# Patient Record
Sex: FEMALE | Race: WHITE | Employment: UNEMPLOYED | URBAN - METROPOLITAN AREA
[De-identification: names, ages, dates, MRNs, and addresses within clinical notes are randomized per-mention and may not be internally consistent; named-entity substitution may affect disease eponyms.]

---

## 2017-10-05 ENCOUNTER — HOSPITAL ENCOUNTER (OUTPATIENT)
Dept: MAMMOGRAPHY | Age: 62
Discharge: HOME | End: 2017-10-05
Attending: INTERNAL MEDICINE
Payer: COMMERCIAL

## 2017-10-05 DIAGNOSIS — Z12.31 VISIT FOR SCREENING MAMMOGRAM: ICD-10-CM

## 2017-10-05 PROCEDURE — 77067 SCR MAMMO BI INCL CAD: CPT

## 2018-10-13 ENCOUNTER — HOSPITAL ENCOUNTER (OUTPATIENT)
Dept: MAMMOGRAPHY | Age: 63
Discharge: HOME | End: 2018-10-13
Attending: INTERNAL MEDICINE
Payer: COMMERCIAL

## 2018-10-13 DIAGNOSIS — Z12.31 VISIT FOR SCREENING MAMMOGRAM: ICD-10-CM

## 2018-10-13 PROCEDURE — 77063 BREAST TOMOSYNTHESIS BI: CPT

## 2019-01-17 ENCOUNTER — HOSPITAL ENCOUNTER (OUTPATIENT)
Dept: MAMMOGRAPHY | Age: 64
Discharge: HOME | End: 2019-01-17
Attending: INTERNAL MEDICINE
Payer: COMMERCIAL

## 2019-01-17 DIAGNOSIS — N64.3 GALACTORRHEA: ICD-10-CM

## 2019-01-17 PROCEDURE — 76642 ULTRASOUND BREAST LIMITED: CPT

## 2019-01-17 PROCEDURE — 77066 DX MAMMO INCL CAD BI: CPT

## 2019-07-11 ENCOUNTER — HOSPITAL ENCOUNTER (OUTPATIENT)
Dept: PHYSICAL THERAPY | Age: 64
Discharge: HOME | End: 2019-07-11
Payer: COMMERCIAL

## 2019-07-11 PROCEDURE — 97161 PT EVAL LOW COMPLEX 20 MIN: CPT

## 2019-07-11 PROCEDURE — 97140 MANUAL THERAPY 1/> REGIONS: CPT

## 2019-07-11 PROCEDURE — 97110 THERAPEUTIC EXERCISES: CPT
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
## 2019-07-19 ENCOUNTER — HOSPITAL ENCOUNTER (OUTPATIENT)
Dept: PHYSICAL THERAPY | Age: 64
Discharge: HOME | End: 2019-07-19
Payer: COMMERCIAL

## 2019-07-19 PROCEDURE — 97140 MANUAL THERAPY 1/> REGIONS: CPT

## 2019-07-19 PROCEDURE — 97110 THERAPEUTIC EXERCISES: CPT
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
## 2019-07-22 ENCOUNTER — HOSPITAL ENCOUNTER (OUTPATIENT)
Dept: PHYSICAL THERAPY | Age: 64
Discharge: HOME | End: 2019-07-22
Payer: COMMERCIAL

## 2019-07-22 PROCEDURE — 97110 THERAPEUTIC EXERCISES: CPT

## 2019-07-22 PROCEDURE — 97140 MANUAL THERAPY 1/> REGIONS: CPT
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
## 2019-07-24 ENCOUNTER — HOSPITAL ENCOUNTER (OUTPATIENT)
Dept: PHYSICAL THERAPY | Age: 64
Discharge: HOME | End: 2019-07-24
Payer: COMMERCIAL

## 2019-07-24 PROCEDURE — 97140 MANUAL THERAPY 1/> REGIONS: CPT

## 2019-07-24 PROCEDURE — 97110 THERAPEUTIC EXERCISES: CPT
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
## 2019-08-02 ENCOUNTER — HOSPITAL ENCOUNTER (OUTPATIENT)
Dept: PHYSICAL THERAPY | Age: 64
Discharge: HOME | End: 2019-08-02
Payer: COMMERCIAL

## 2019-08-02 PROCEDURE — 97110 THERAPEUTIC EXERCISES: CPT

## 2019-08-02 PROCEDURE — 97140 MANUAL THERAPY 1/> REGIONS: CPT

## 2019-08-02 PROCEDURE — 97035 APP MDLTY 1+ULTRASOUND EA 15: CPT

## 2019-08-02 PROCEDURE — 97014 ELECTRIC STIMULATION THERAPY: CPT
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
## 2019-08-05 ENCOUNTER — HOSPITAL ENCOUNTER (OUTPATIENT)
Dept: PHYSICAL THERAPY | Age: 64
Discharge: HOME | End: 2019-08-05
Payer: COMMERCIAL

## 2019-08-05 PROCEDURE — 97140 MANUAL THERAPY 1/> REGIONS: CPT

## 2019-08-05 PROCEDURE — 97110 THERAPEUTIC EXERCISES: CPT

## 2019-08-05 PROCEDURE — 97014 ELECTRIC STIMULATION THERAPY: CPT

## 2019-08-05 PROCEDURE — 97035 APP MDLTY 1+ULTRASOUND EA 15: CPT
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
## 2019-08-09 ENCOUNTER — HOSPITAL ENCOUNTER (OUTPATIENT)
Dept: PHYSICAL THERAPY | Age: 64
Discharge: HOME | End: 2019-08-09
Payer: COMMERCIAL

## 2019-08-09 PROCEDURE — 97140 MANUAL THERAPY 1/> REGIONS: CPT

## 2019-08-09 PROCEDURE — 97014 ELECTRIC STIMULATION THERAPY: CPT

## 2019-08-09 PROCEDURE — 97035 APP MDLTY 1+ULTRASOUND EA 15: CPT
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
## 2019-08-12 ENCOUNTER — HOSPITAL ENCOUNTER (OUTPATIENT)
Dept: PHYSICAL THERAPY | Age: 64
Discharge: HOME | End: 2019-08-12
Payer: COMMERCIAL

## 2019-08-12 PROCEDURE — 97035 APP MDLTY 1+ULTRASOUND EA 15: CPT

## 2019-08-12 PROCEDURE — 97110 THERAPEUTIC EXERCISES: CPT

## 2019-08-12 PROCEDURE — 97140 MANUAL THERAPY 1/> REGIONS: CPT

## 2019-08-12 PROCEDURE — 97014 ELECTRIC STIMULATION THERAPY: CPT
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
## 2019-08-16 ENCOUNTER — HOSPITAL ENCOUNTER (OUTPATIENT)
Dept: PHYSICAL THERAPY | Age: 64
Discharge: HOME | End: 2019-08-16
Payer: COMMERCIAL

## 2019-08-16 PROCEDURE — 97140 MANUAL THERAPY 1/> REGIONS: CPT

## 2019-08-16 PROCEDURE — 97035 APP MDLTY 1+ULTRASOUND EA 15: CPT

## 2019-08-16 PROCEDURE — 97110 THERAPEUTIC EXERCISES: CPT

## 2019-08-16 PROCEDURE — 97014 ELECTRIC STIMULATION THERAPY: CPT
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
## 2019-08-19 ENCOUNTER — HOSPITAL ENCOUNTER (OUTPATIENT)
Dept: PHYSICAL THERAPY | Age: 64
Discharge: HOME | End: 2019-08-19
Payer: COMMERCIAL

## 2019-08-19 PROCEDURE — 97035 APP MDLTY 1+ULTRASOUND EA 15: CPT

## 2019-08-19 PROCEDURE — 97110 THERAPEUTIC EXERCISES: CPT

## 2019-08-19 PROCEDURE — 97014 ELECTRIC STIMULATION THERAPY: CPT

## 2019-08-19 PROCEDURE — 97140 MANUAL THERAPY 1/> REGIONS: CPT
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
## 2019-08-23 ENCOUNTER — HOSPITAL ENCOUNTER (OUTPATIENT)
Dept: PHYSICAL THERAPY | Age: 64
End: 2019-08-23
Payer: COMMERCIAL

## 2020-10-30 ENCOUNTER — TRANSCRIBE ORDER (OUTPATIENT)
Dept: SCHEDULING | Age: 65
End: 2020-10-30

## 2020-10-30 DIAGNOSIS — Z12.31 SCREENING MAMMOGRAM, ENCOUNTER FOR: Primary | ICD-10-CM

## 2020-12-03 ENCOUNTER — HOSPITAL ENCOUNTER (OUTPATIENT)
Dept: MAMMOGRAPHY | Age: 65
Discharge: HOME | End: 2020-12-03
Attending: INTERNAL MEDICINE
Payer: MEDICARE

## 2020-12-03 DIAGNOSIS — Z12.31 SCREENING MAMMOGRAM, ENCOUNTER FOR: ICD-10-CM

## 2020-12-03 PROCEDURE — 77063 BREAST TOMOSYNTHESIS BI: CPT

## 2021-12-17 ENCOUNTER — TRANSCRIBE ORDER (OUTPATIENT)
Dept: REGISTRATION | Age: 66
End: 2021-12-17

## 2021-12-17 DIAGNOSIS — Z12.31 SCREENING MAMMOGRAM FOR HIGH-RISK PATIENT: Primary | ICD-10-CM

## 2022-01-06 ENCOUNTER — HOSPITAL ENCOUNTER (OUTPATIENT)
Dept: MAMMOGRAPHY | Age: 67
Discharge: HOME | End: 2022-01-06
Attending: INTERNAL MEDICINE
Payer: MEDICARE

## 2022-01-06 DIAGNOSIS — Z12.31 SCREENING MAMMOGRAM FOR HIGH-RISK PATIENT: ICD-10-CM

## 2022-01-06 PROCEDURE — 77063 BREAST TOMOSYNTHESIS BI: CPT

## 2022-03-08 ENCOUNTER — HOSPITAL ENCOUNTER (OUTPATIENT)
Dept: PHYSICAL THERAPY | Age: 67
Discharge: HOME | End: 2022-03-08
Payer: MEDICARE

## 2022-03-08 PROCEDURE — 97140 MANUAL THERAPY 1/> REGIONS: CPT

## 2022-03-08 PROCEDURE — 97110 THERAPEUTIC EXERCISES: CPT

## 2022-03-08 PROCEDURE — 97161 PT EVAL LOW COMPLEX 20 MIN: CPT
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
## 2022-03-18 ENCOUNTER — HOSPITAL ENCOUNTER (OUTPATIENT)
Dept: PHYSICAL THERAPY | Age: 67
Discharge: HOME | End: 2022-03-18
Payer: MEDICARE

## 2022-03-18 PROCEDURE — 97110 THERAPEUTIC EXERCISES: CPT

## 2022-03-18 PROCEDURE — 97140 MANUAL THERAPY 1/> REGIONS: CPT
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
## 2022-03-22 ENCOUNTER — HOSPITAL ENCOUNTER (OUTPATIENT)
Dept: PHYSICAL THERAPY | Age: 67
Discharge: HOME | End: 2022-03-22
Payer: MEDICARE

## 2022-03-22 PROCEDURE — 97140 MANUAL THERAPY 1/> REGIONS: CPT

## 2022-03-22 PROCEDURE — 97110 THERAPEUTIC EXERCISES: CPT
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
## 2022-03-25 ENCOUNTER — HOSPITAL ENCOUNTER (OUTPATIENT)
Dept: PHYSICAL THERAPY | Age: 67
Discharge: HOME | End: 2022-03-25
Payer: MEDICARE

## 2022-03-25 NOTE — PROGRESS NOTES
Physical Therapy  [address] [place] [address]  Date: 3/25/22    Patient declined appointment today due to illness.       Minesh Chang DPT

## 2022-03-29 ENCOUNTER — HOSPITAL ENCOUNTER (OUTPATIENT)
Dept: PHYSICAL THERAPY | Age: 67
Discharge: HOME | End: 2022-03-29
Payer: MEDICARE

## 2022-03-29 PROCEDURE — 97140 MANUAL THERAPY 1/> REGIONS: CPT

## 2022-03-29 PROCEDURE — 97110 THERAPEUTIC EXERCISES: CPT
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
## 2022-03-31 ENCOUNTER — HOSPITAL ENCOUNTER (OUTPATIENT)
Dept: PHYSICAL THERAPY | Age: 67
Discharge: HOME | End: 2022-03-31
Payer: MEDICARE

## 2022-03-31 PROCEDURE — 97140 MANUAL THERAPY 1/> REGIONS: CPT

## 2022-03-31 PROCEDURE — 97110 THERAPEUTIC EXERCISES: CPT
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
## 2022-04-05 ENCOUNTER — HOSPITAL ENCOUNTER (OUTPATIENT)
Dept: PHYSICAL THERAPY | Age: 67
Discharge: HOME | End: 2022-04-05
Payer: MEDICARE

## 2022-04-05 PROCEDURE — 97140 MANUAL THERAPY 1/> REGIONS: CPT

## 2022-04-05 PROCEDURE — 97110 THERAPEUTIC EXERCISES: CPT
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
## 2022-04-07 ENCOUNTER — HOSPITAL ENCOUNTER (OUTPATIENT)
Dept: PHYSICAL THERAPY | Age: 67
Discharge: HOME | End: 2022-04-07
Payer: MEDICARE

## 2022-04-07 PROCEDURE — 97140 MANUAL THERAPY 1/> REGIONS: CPT
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
## 2022-04-12 ENCOUNTER — APPOINTMENT (OUTPATIENT)
Dept: PHYSICAL THERAPY | Age: 67
End: 2022-04-12
Payer: MEDICARE

## 2022-04-13 ENCOUNTER — HOSPITAL ENCOUNTER (OUTPATIENT)
Dept: PHYSICAL THERAPY | Age: 67
Discharge: HOME | End: 2022-04-13
Payer: MEDICARE

## 2022-04-13 PROCEDURE — 97140 MANUAL THERAPY 1/> REGIONS: CPT

## 2022-04-13 PROCEDURE — 97110 THERAPEUTIC EXERCISES: CPT
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
## 2022-04-14 ENCOUNTER — HOSPITAL ENCOUNTER (OUTPATIENT)
Dept: PHYSICAL THERAPY | Age: 67
Discharge: HOME | End: 2022-04-14
Payer: MEDICARE

## 2022-04-14 PROCEDURE — 97140 MANUAL THERAPY 1/> REGIONS: CPT

## 2022-04-14 PROCEDURE — 97110 THERAPEUTIC EXERCISES: CPT

## 2022-04-19 ENCOUNTER — HOSPITAL ENCOUNTER (OUTPATIENT)
Dept: PHYSICAL THERAPY | Age: 67
Discharge: HOME | End: 2022-04-19
Payer: MEDICARE

## 2022-04-19 PROCEDURE — 97110 THERAPEUTIC EXERCISES: CPT

## 2022-04-19 PROCEDURE — 97140 MANUAL THERAPY 1/> REGIONS: CPT
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
## 2022-04-21 ENCOUNTER — APPOINTMENT (OUTPATIENT)
Dept: PHYSICAL THERAPY | Age: 67
End: 2022-04-21
Payer: MEDICARE

## 2022-04-26 ENCOUNTER — HOSPITAL ENCOUNTER (OUTPATIENT)
Dept: PHYSICAL THERAPY | Age: 67
Discharge: HOME | End: 2022-04-26
Payer: MEDICARE

## 2022-04-28 ENCOUNTER — HOSPITAL ENCOUNTER (OUTPATIENT)
Dept: PHYSICAL THERAPY | Age: 67
Discharge: HOME | End: 2022-04-28
Payer: MEDICARE

## 2022-04-28 PROCEDURE — 97140 MANUAL THERAPY 1/> REGIONS: CPT

## 2022-04-28 PROCEDURE — 97110 THERAPEUTIC EXERCISES: CPT
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
## 2022-05-02 ENCOUNTER — HOSPITAL ENCOUNTER (OUTPATIENT)
Dept: PHYSICAL THERAPY | Age: 67
Setting detail: RECURRING SERIES
Discharge: HOME | End: 2022-05-05
Payer: MEDICARE

## 2022-05-03 ENCOUNTER — HOSPITAL ENCOUNTER (OUTPATIENT)
Dept: PHYSICAL THERAPY | Age: 67
Discharge: HOME | End: 2022-05-03
Payer: MEDICARE

## 2022-05-03 PROCEDURE — 97110 THERAPEUTIC EXERCISES: CPT

## 2022-05-03 PROCEDURE — 97140 MANUAL THERAPY 1/> REGIONS: CPT
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
## 2022-05-05 ENCOUNTER — HOSPITAL ENCOUNTER (OUTPATIENT)
Dept: PHYSICAL THERAPY | Age: 67
Discharge: HOME | End: 2022-05-05
Payer: MEDICARE

## 2022-05-05 PROCEDURE — 97110 THERAPEUTIC EXERCISES: CPT

## 2022-05-05 PROCEDURE — 97140 MANUAL THERAPY 1/> REGIONS: CPT
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
## 2022-05-10 ENCOUNTER — HOSPITAL ENCOUNTER (OUTPATIENT)
Dept: PHYSICAL THERAPY | Age: 67
Discharge: HOME | End: 2022-05-10
Payer: MEDICARE

## 2022-05-10 PROCEDURE — 97110 THERAPEUTIC EXERCISES: CPT

## 2022-05-10 PROCEDURE — 97140 MANUAL THERAPY 1/> REGIONS: CPT
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
## 2022-05-12 ENCOUNTER — HOSPITAL ENCOUNTER (OUTPATIENT)
Dept: PHYSICAL THERAPY | Age: 67
Discharge: HOME | End: 2022-05-12
Payer: MEDICARE

## 2022-05-12 PROCEDURE — 97140 MANUAL THERAPY 1/> REGIONS: CPT
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
## 2022-05-16 ENCOUNTER — HOSPITAL ENCOUNTER (OUTPATIENT)
Dept: PHYSICAL THERAPY | Age: 67
Discharge: HOME | End: 2022-05-16
Payer: MEDICARE

## 2022-05-16 PROCEDURE — 97110 THERAPEUTIC EXERCISES: CPT

## 2022-05-16 PROCEDURE — 97140 MANUAL THERAPY 1/> REGIONS: CPT
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
## 2022-05-17 ENCOUNTER — APPOINTMENT (OUTPATIENT)
Dept: PHYSICAL THERAPY | Age: 67
End: 2022-05-17
Payer: MEDICARE

## 2022-05-19 ENCOUNTER — HOSPITAL ENCOUNTER (OUTPATIENT)
Dept: PHYSICAL THERAPY | Age: 67
Discharge: HOME | End: 2022-05-19
Payer: MEDICARE

## 2022-05-23 ENCOUNTER — HOSPITAL ENCOUNTER (OUTPATIENT)
Dept: PHYSICAL THERAPY | Age: 67
Setting detail: RECURRING SERIES
Discharge: HOME | End: 2022-05-26
Payer: MEDICARE

## 2022-05-23 PROCEDURE — 97110 THERAPEUTIC EXERCISES: CPT

## 2022-05-23 PROCEDURE — 97140 MANUAL THERAPY 1/> REGIONS: CPT

## 2022-05-23 ASSESSMENT — PAIN DESCRIPTION - LOCATION: LOCATION: BACK

## 2022-05-23 ASSESSMENT — PAIN DESCRIPTION - ORIENTATION: ORIENTATION: UPPER;LEFT

## 2022-05-23 ASSESSMENT — PAIN SCALES - GENERAL: PAINLEVEL_OUTOF10: 3

## 2022-05-23 ASSESSMENT — PAIN DESCRIPTION - DESCRIPTORS: DESCRIPTORS: ACHING;TIGHTNESS
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
## 2022-05-25 ENCOUNTER — HOSPITAL ENCOUNTER (OUTPATIENT)
Dept: PHYSICAL THERAPY | Age: 67
Setting detail: RECURRING SERIES
Discharge: HOME | End: 2022-05-28
Payer: MEDICARE

## 2022-05-25 PROCEDURE — 97140 MANUAL THERAPY 1/> REGIONS: CPT

## 2022-05-25 PROCEDURE — 97110 THERAPEUTIC EXERCISES: CPT

## 2022-05-25 ASSESSMENT — PAIN SCALES - GENERAL: PAINLEVEL_OUTOF10: 4

## 2022-05-25 ASSESSMENT — PAIN DESCRIPTION - DESCRIPTORS: DESCRIPTORS: ACHING;SORE

## 2022-05-25 ASSESSMENT — PAIN DESCRIPTION - LOCATION: LOCATION: BACK

## 2022-05-25 ASSESSMENT — PAIN DESCRIPTION - ORIENTATION: ORIENTATION: UPPER
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
## 2022-06-01 ENCOUNTER — HOSPITAL ENCOUNTER (OUTPATIENT)
Dept: PHYSICAL THERAPY | Age: 67
Setting detail: RECURRING SERIES
Discharge: HOME | End: 2022-06-04
Payer: MEDICARE

## 2022-06-01 PROCEDURE — 97140 MANUAL THERAPY 1/> REGIONS: CPT

## 2022-06-01 PROCEDURE — 97110 THERAPEUTIC EXERCISES: CPT

## 2022-06-01 ASSESSMENT — PAIN DESCRIPTION - LOCATION: LOCATION: NECK

## 2022-06-01 ASSESSMENT — PAIN SCALES - GENERAL: PAINLEVEL_OUTOF10: 2

## 2022-06-01 ASSESSMENT — PAIN DESCRIPTION - ORIENTATION: ORIENTATION: LOWER

## 2022-06-01 ASSESSMENT — PAIN DESCRIPTION - DESCRIPTORS: DESCRIPTORS: SORE;TIGHTNESS
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
## 2022-06-03 ENCOUNTER — HOSPITAL ENCOUNTER (OUTPATIENT)
Dept: PHYSICAL THERAPY | Age: 67
Setting detail: RECURRING SERIES
Discharge: HOME | End: 2022-06-06
Payer: MEDICARE

## 2022-06-03 PROCEDURE — 97140 MANUAL THERAPY 1/> REGIONS: CPT

## 2022-06-03 PROCEDURE — 97110 THERAPEUTIC EXERCISES: CPT

## 2022-06-03 ASSESSMENT — PAIN DESCRIPTION - DESCRIPTORS: DESCRIPTORS: ACHING;TIGHTNESS

## 2022-06-03 ASSESSMENT — PAIN DESCRIPTION - LOCATION: LOCATION: NECK;SHOULDER

## 2022-06-03 ASSESSMENT — PAIN DESCRIPTION - ORIENTATION: ORIENTATION: LEFT

## 2022-06-03 ASSESSMENT — PAIN SCALES - GENERAL: PAINLEVEL_OUTOF10: 3
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
## 2022-06-06 ENCOUNTER — HOSPITAL ENCOUNTER (OUTPATIENT)
Dept: PHYSICAL THERAPY | Age: 67
Setting detail: RECURRING SERIES
Discharge: HOME | End: 2022-06-09
Payer: MEDICARE

## 2022-06-06 PROCEDURE — 97140 MANUAL THERAPY 1/> REGIONS: CPT

## 2022-06-06 PROCEDURE — 97110 THERAPEUTIC EXERCISES: CPT

## 2022-06-06 ASSESSMENT — PAIN DESCRIPTION - LOCATION: LOCATION: NECK

## 2022-06-06 ASSESSMENT — PAIN SCALES - GENERAL: PAINLEVEL_OUTOF10: 3

## 2022-06-06 ASSESSMENT — PAIN DESCRIPTION - DESCRIPTORS: DESCRIPTORS: ACHING;TIGHTNESS

## 2022-06-06 ASSESSMENT — PAIN DESCRIPTION - ORIENTATION: ORIENTATION: LEFT
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
## 2022-06-09 ENCOUNTER — HOSPITAL ENCOUNTER (OUTPATIENT)
Dept: PHYSICAL THERAPY | Age: 67
Setting detail: RECURRING SERIES
Discharge: HOME | End: 2022-06-12
Payer: MEDICARE

## 2022-06-09 PROCEDURE — 97140 MANUAL THERAPY 1/> REGIONS: CPT

## 2022-06-09 PROCEDURE — 97110 THERAPEUTIC EXERCISES: CPT

## 2022-06-09 ASSESSMENT — PAIN SCALES - GENERAL: PAINLEVEL_OUTOF10: 3

## 2022-06-09 ASSESSMENT — PAIN DESCRIPTION - DESCRIPTORS: DESCRIPTORS: ACHING;SORE

## 2022-06-09 ASSESSMENT — PAIN DESCRIPTION - ORIENTATION: ORIENTATION: LEFT

## 2022-06-09 ASSESSMENT — PAIN DESCRIPTION - LOCATION: LOCATION: NECK
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
## 2022-06-13 ENCOUNTER — HOSPITAL ENCOUNTER (OUTPATIENT)
Dept: PHYSICAL THERAPY | Age: 67
Setting detail: RECURRING SERIES
Discharge: HOME | End: 2022-06-16
Payer: MEDICARE

## 2022-06-13 PROCEDURE — 97110 THERAPEUTIC EXERCISES: CPT

## 2022-06-13 PROCEDURE — 97140 MANUAL THERAPY 1/> REGIONS: CPT

## 2022-06-13 ASSESSMENT — PAIN SCALES - GENERAL: PAINLEVEL_OUTOF10: 3

## 2022-06-13 ASSESSMENT — PAIN DESCRIPTION - DESCRIPTORS: DESCRIPTORS: ACHING;TIGHTNESS

## 2022-06-13 ASSESSMENT — PAIN DESCRIPTION - ORIENTATION: ORIENTATION: LEFT

## 2022-06-13 ASSESSMENT — PAIN DESCRIPTION - LOCATION: LOCATION: NECK

## 2022-06-15 ENCOUNTER — HOSPITAL ENCOUNTER (OUTPATIENT)
Dept: PHYSICAL THERAPY | Age: 67
Setting detail: RECURRING SERIES
Discharge: HOME | End: 2022-06-18
Payer: MEDICARE

## 2022-06-15 PROCEDURE — 97110 THERAPEUTIC EXERCISES: CPT

## 2022-06-15 PROCEDURE — 97140 MANUAL THERAPY 1/> REGIONS: CPT

## 2022-06-15 ASSESSMENT — PAIN SCALES - GENERAL: PAINLEVEL_OUTOF10: 2

## 2022-06-15 ASSESSMENT — PAIN DESCRIPTION - DESCRIPTORS: DESCRIPTORS: ACHING;TIGHTNESS

## 2022-06-15 ASSESSMENT — PAIN DESCRIPTION - LOCATION: LOCATION: NECK;SHOULDER

## 2022-06-15 ASSESSMENT — PAIN DESCRIPTION - ORIENTATION: ORIENTATION: LEFT
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
## 2022-06-20 ENCOUNTER — HOSPITAL ENCOUNTER (OUTPATIENT)
Dept: PHYSICAL THERAPY | Age: 67
Setting detail: RECURRING SERIES
Discharge: HOME | End: 2022-06-23
Payer: MEDICARE

## 2022-06-20 PROCEDURE — 97140 MANUAL THERAPY 1/> REGIONS: CPT

## 2022-06-20 PROCEDURE — 97110 THERAPEUTIC EXERCISES: CPT

## 2022-06-20 ASSESSMENT — PAIN DESCRIPTION - LOCATION: LOCATION: SHOULDER

## 2022-06-20 ASSESSMENT — PAIN DESCRIPTION - DESCRIPTORS: DESCRIPTORS: TIGHTNESS;SORE

## 2022-06-20 ASSESSMENT — PAIN DESCRIPTION - ORIENTATION: ORIENTATION: LEFT

## 2022-06-20 ASSESSMENT — PAIN SCALES - GENERAL: PAINLEVEL_OUTOF10: 2
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
## 2022-11-02 ENCOUNTER — HOSPITAL ENCOUNTER (OUTPATIENT)
Dept: PHYSICAL THERAPY | Age: 67
Setting detail: RECURRING SERIES
Discharge: HOME | End: 2022-11-05
Payer: MEDICARE

## 2022-11-02 PROCEDURE — 97110 THERAPEUTIC EXERCISES: CPT

## 2022-11-02 PROCEDURE — 97161 PT EVAL LOW COMPLEX 20 MIN: CPT

## 2022-11-02 PROCEDURE — 97140 MANUAL THERAPY 1/> REGIONS: CPT

## 2022-11-02 ASSESSMENT — PAIN SCALES - GENERAL: PAINLEVEL_OUTOF10: 5
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
## 2022-11-04 ENCOUNTER — HOSPITAL ENCOUNTER (OUTPATIENT)
Dept: PHYSICAL THERAPY | Age: 67
Setting detail: RECURRING SERIES
Discharge: HOME | End: 2022-11-07
Payer: MEDICARE

## 2022-11-04 PROCEDURE — 97140 MANUAL THERAPY 1/> REGIONS: CPT

## 2022-11-04 PROCEDURE — 97110 THERAPEUTIC EXERCISES: CPT

## 2022-11-04 ASSESSMENT — PAIN SCALES - GENERAL: PAINLEVEL_OUTOF10: 5
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
## 2022-11-08 ENCOUNTER — HOSPITAL ENCOUNTER (OUTPATIENT)
Dept: PHYSICAL THERAPY | Age: 67
Setting detail: RECURRING SERIES
Discharge: HOME | End: 2022-11-11
Payer: MEDICARE

## 2022-11-08 PROCEDURE — 97110 THERAPEUTIC EXERCISES: CPT

## 2022-11-08 PROCEDURE — 97140 MANUAL THERAPY 1/> REGIONS: CPT

## 2022-11-08 ASSESSMENT — PAIN SCALES - GENERAL: PAINLEVEL_OUTOF10: 4
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
## 2022-11-10 ENCOUNTER — HOSPITAL ENCOUNTER (OUTPATIENT)
Dept: PHYSICAL THERAPY | Age: 67
Setting detail: RECURRING SERIES
Discharge: HOME | End: 2022-11-13
Payer: MEDICARE

## 2022-11-10 PROCEDURE — 97110 THERAPEUTIC EXERCISES: CPT

## 2022-11-10 PROCEDURE — 97140 MANUAL THERAPY 1/> REGIONS: CPT

## 2022-11-10 ASSESSMENT — PAIN SCALES - GENERAL: PAINLEVEL_OUTOF10: 5
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
## 2022-11-14 ENCOUNTER — HOSPITAL ENCOUNTER (OUTPATIENT)
Dept: PHYSICAL THERAPY | Age: 67
Setting detail: RECURRING SERIES
Discharge: HOME | End: 2022-11-17
Payer: MEDICARE

## 2022-11-14 PROCEDURE — 97140 MANUAL THERAPY 1/> REGIONS: CPT

## 2022-11-14 PROCEDURE — 97110 THERAPEUTIC EXERCISES: CPT

## 2022-11-14 ASSESSMENT — PAIN SCALES - GENERAL: PAINLEVEL_OUTOF10: 6
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
## 2022-11-17 ENCOUNTER — HOSPITAL ENCOUNTER (OUTPATIENT)
Dept: PHYSICAL THERAPY | Age: 67
Setting detail: RECURRING SERIES
Discharge: HOME | End: 2022-11-20
Payer: MEDICARE

## 2022-11-17 PROCEDURE — 97110 THERAPEUTIC EXERCISES: CPT

## 2022-11-17 PROCEDURE — 97140 MANUAL THERAPY 1/> REGIONS: CPT

## 2022-11-17 ASSESSMENT — PAIN SCALES - GENERAL: PAINLEVEL_OUTOF10: 4
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
## 2022-11-22 ENCOUNTER — HOSPITAL ENCOUNTER (OUTPATIENT)
Dept: PHYSICAL THERAPY | Age: 67
Setting detail: RECURRING SERIES
Discharge: HOME | End: 2022-11-25
Payer: MEDICARE

## 2022-11-22 PROCEDURE — 97110 THERAPEUTIC EXERCISES: CPT

## 2022-11-22 ASSESSMENT — PAIN SCALES - GENERAL: PAINLEVEL_OUTOF10: 5
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
## 2022-11-23 ENCOUNTER — HOSPITAL ENCOUNTER (OUTPATIENT)
Dept: PHYSICAL THERAPY | Age: 67
Setting detail: RECURRING SERIES
End: 2022-11-23
Payer: MEDICARE

## 2022-11-29 ENCOUNTER — HOSPITAL ENCOUNTER (OUTPATIENT)
Dept: PHYSICAL THERAPY | Age: 67
Setting detail: RECURRING SERIES
Discharge: HOME | End: 2022-12-02
Payer: MEDICARE

## 2022-11-29 PROCEDURE — 97140 MANUAL THERAPY 1/> REGIONS: CPT

## 2022-11-29 PROCEDURE — 97110 THERAPEUTIC EXERCISES: CPT

## 2022-11-29 ASSESSMENT — PAIN SCALES - GENERAL: PAINLEVEL_OUTOF10: 4
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
## 2022-12-01 ENCOUNTER — HOSPITAL ENCOUNTER (OUTPATIENT)
Dept: PHYSICAL THERAPY | Age: 67
Setting detail: RECURRING SERIES
Discharge: HOME | End: 2022-12-04
Payer: MEDICARE

## 2022-12-01 PROCEDURE — 97140 MANUAL THERAPY 1/> REGIONS: CPT

## 2022-12-01 PROCEDURE — 97110 THERAPEUTIC EXERCISES: CPT

## 2022-12-01 ASSESSMENT — PAIN SCALES - GENERAL: PAINLEVEL_OUTOF10: 3
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
## 2022-12-06 ENCOUNTER — HOSPITAL ENCOUNTER (OUTPATIENT)
Dept: PHYSICAL THERAPY | Age: 67
Setting detail: RECURRING SERIES
Discharge: HOME | End: 2022-12-09
Payer: MEDICARE

## 2022-12-06 PROCEDURE — 97140 MANUAL THERAPY 1/> REGIONS: CPT

## 2022-12-06 PROCEDURE — 97110 THERAPEUTIC EXERCISES: CPT

## 2022-12-06 ASSESSMENT — PAIN SCALES - GENERAL: PAINLEVEL_OUTOF10: 4
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
## 2022-12-08 ENCOUNTER — HOSPITAL ENCOUNTER (OUTPATIENT)
Dept: PHYSICAL THERAPY | Age: 67
Setting detail: RECURRING SERIES
Discharge: HOME | End: 2022-12-11
Payer: MEDICARE

## 2022-12-08 PROCEDURE — 97140 MANUAL THERAPY 1/> REGIONS: CPT

## 2022-12-08 PROCEDURE — 97110 THERAPEUTIC EXERCISES: CPT

## 2022-12-08 ASSESSMENT — PAIN SCALES - GENERAL: PAINLEVEL_OUTOF10: 5
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
## 2022-12-13 ENCOUNTER — HOSPITAL ENCOUNTER (OUTPATIENT)
Dept: PHYSICAL THERAPY | Age: 67
Setting detail: RECURRING SERIES
Discharge: HOME | End: 2022-12-16
Payer: MEDICARE

## 2022-12-13 PROCEDURE — 97140 MANUAL THERAPY 1/> REGIONS: CPT

## 2022-12-13 PROCEDURE — 97110 THERAPEUTIC EXERCISES: CPT

## 2022-12-13 ASSESSMENT — PAIN SCALES - GENERAL: PAINLEVEL_OUTOF10: 3
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
## 2022-12-15 ENCOUNTER — HOSPITAL ENCOUNTER (OUTPATIENT)
Dept: PHYSICAL THERAPY | Age: 67
Setting detail: RECURRING SERIES
Discharge: HOME | End: 2022-12-18
Payer: MEDICARE

## 2022-12-15 PROCEDURE — 97140 MANUAL THERAPY 1/> REGIONS: CPT

## 2022-12-15 PROCEDURE — 97110 THERAPEUTIC EXERCISES: CPT

## 2022-12-15 ASSESSMENT — PAIN SCALES - GENERAL: PAINLEVEL_OUTOF10: 2
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
## 2022-12-20 ENCOUNTER — HOSPITAL ENCOUNTER (OUTPATIENT)
Dept: PHYSICAL THERAPY | Age: 67
Setting detail: RECURRING SERIES
Discharge: HOME | End: 2022-12-23
Payer: MEDICARE

## 2022-12-20 PROCEDURE — 97140 MANUAL THERAPY 1/> REGIONS: CPT

## 2022-12-20 PROCEDURE — 97110 THERAPEUTIC EXERCISES: CPT

## 2022-12-20 ASSESSMENT — PAIN SCALES - GENERAL: PAINLEVEL_OUTOF10: 0
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
## 2022-12-22 ENCOUNTER — HOSPITAL ENCOUNTER (OUTPATIENT)
Dept: PHYSICAL THERAPY | Age: 67
Setting detail: RECURRING SERIES
Discharge: HOME | End: 2022-12-25
Payer: MEDICARE

## 2022-12-22 PROCEDURE — 97140 MANUAL THERAPY 1/> REGIONS: CPT

## 2022-12-22 PROCEDURE — 97110 THERAPEUTIC EXERCISES: CPT

## 2022-12-22 ASSESSMENT — PAIN SCALES - GENERAL: PAINLEVEL_OUTOF10: 3
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
## 2022-12-29 ENCOUNTER — HOSPITAL ENCOUNTER (OUTPATIENT)
Dept: PHYSICAL THERAPY | Age: 67
Setting detail: RECURRING SERIES
End: 2022-12-29
Payer: MEDICARE

## 2022-12-29 PROCEDURE — 97140 MANUAL THERAPY 1/> REGIONS: CPT

## 2022-12-29 PROCEDURE — 97110 THERAPEUTIC EXERCISES: CPT

## 2022-12-29 ASSESSMENT — PAIN SCALES - GENERAL: PAINLEVEL_OUTOF10: 3
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
## 2023-04-28 ENCOUNTER — HOSPITAL ENCOUNTER (OUTPATIENT)
Dept: MAMMOGRAPHY | Age: 68
End: 2023-04-28
Payer: MEDICARE

## 2023-04-28 DIAGNOSIS — Z12.31 VISIT FOR SCREENING MAMMOGRAM: ICD-10-CM

## 2023-04-28 PROCEDURE — 77063 BREAST TOMOSYNTHESIS BI: CPT

## 2023-09-19 ENCOUNTER — HOSPITAL ENCOUNTER (OUTPATIENT)
Dept: PHYSICAL THERAPY | Age: 68
Setting detail: RECURRING SERIES
Discharge: HOME | End: 2023-09-22
Payer: MEDICARE

## 2023-09-19 DIAGNOSIS — M54.2 NECK PAIN: Primary | ICD-10-CM

## 2023-09-19 DIAGNOSIS — M77.01 MEDIAL EPICONDYLITIS, RIGHT ELBOW: ICD-10-CM

## 2023-09-19 DIAGNOSIS — M62.81 MUSCLE WEAKNESS (GENERALIZED): ICD-10-CM

## 2023-09-19 DIAGNOSIS — M54.12 RADICULOPATHY, CERVICAL REGION: ICD-10-CM

## 2023-09-19 DIAGNOSIS — M25.521 PAIN IN RIGHT ELBOW: ICD-10-CM

## 2023-09-19 PROCEDURE — 97140 MANUAL THERAPY 1/> REGIONS: CPT

## 2023-09-19 PROCEDURE — 97161 PT EVAL LOW COMPLEX 20 MIN: CPT

## 2023-09-19 PROCEDURE — 97110 THERAPEUTIC EXERCISES: CPT

## 2023-09-19 ASSESSMENT — PAIN SCALES - GENERAL: PAINLEVEL_OUTOF10: 7

## 2023-09-19 NOTE — PROGRESS NOTES
Hipolito Erwin  : 1955  Primary: Starr Amezquita Ppo (Medicare Managed)  Secondary:  [address] @ [address] [facility] [address]  Phone: [phone]  Fax: [phone] Plan Frequency: 2 visits per week for 8 weeks (with plan to decreased frequency to 1 visit per week as symptoms and functional progression allow.)    Plan of Care/Certification Expiration Date: 23 (Start of [employer] 2023)      >PT Visit Info:  Plan Frequency: 2 visits per week for 8 weeks (with plan to decreased frequency to 1 visit per week as symptoms and functional progression allow.)  Plan of Care/Certification Expiration Date: 23 (Start of [employer] 2023)      Visit Count:  1    OUTPATIENT PHYSICAL THERAPY:OP NOTE TYPE: OP Note Type: Treatment Note 2023       Episode  }Appt Desk             Treatment Diagnosis:   Visit Diagnoses         Codes    Neck pain    -  Primary M54.2    Radiculopathy, cervical region     M54.12    Medial epicondylitis, right elbow     M77.01    Pain in right elbow     M25.521    Muscle weakness (generalized)     M62.81          Medical/Referring Diagnosis:  Radiculopathy, cervical region [M54.12]  Medial epicondylitis, right elbow [M77.01]  Referring Physician:  Noa Olmos MD MD Orders:  PT Eval and Treat  Date of Onset:  Onset Date: 23 (Insidious onset left shoulder and right elbow pain approximately 3 months ago.)     Allergies:   Patient has no allergy information on record. Restrictions/Precautions:  Restrictions/Precautions: Other (comment) (Latex allergy.)  No data recorded     Interventions Planned (Treatment may consist of any combination of the following):    Current Treatment Recommendations: Strengthening; ROM; Functional mobility training; Balance training; ADL/Self-care training; Transfer training; IADL training;  Endurance training; Neuromuscular re-education; Manual; Pain management; Home exercise

## 2023-09-19 NOTE — THERAPY EVALUATION
independence with prolonged sitting, functional heads turns while driving and upward gaze activities, light house work and ADL performance.  > Skilled intervention continues to be required due to decreased AROM, decreased strength, decreased functional mobility, decreased functional activity tolerance. Reason For Services/Other Comments:  > Patient continues to require skilled intervention due to increasing complexity of exercise. Total Duration:  Time In: 1010  Time Out: 1118    Regarding Rachelle Mcdermott's therapy, I certify that the treatment plan above will be carried out by a therapist or under their direction.   Thank you for this referral,  Brie Robledo, PT     Referring Physician Signature: Rosita Llamas MD _______________________________ Date _____________        Post Session Pain  Charge Capture  PT Visit Info MD Corinna Beckman

## 2023-09-22 ENCOUNTER — HOSPITAL ENCOUNTER (OUTPATIENT)
Dept: PHYSICAL THERAPY | Age: 68
Setting detail: RECURRING SERIES
Discharge: HOME | End: 2023-09-25
Payer: MEDICARE

## 2023-09-22 PROCEDURE — 97110 THERAPEUTIC EXERCISES: CPT

## 2023-09-22 PROCEDURE — 97140 MANUAL THERAPY 1/> REGIONS: CPT

## 2023-09-22 ASSESSMENT — PAIN SCALES - GENERAL: PAINLEVEL_OUTOF10: 6

## 2023-09-22 NOTE — PROGRESS NOTES
Alphonsa Carrel  : 1955  Primary: Yesica Wilson Ppo (Medicare Managed)  Secondary:  [address] @ [address] [facility] [address]  Phone: [phone]  Fax: [phone] Plan Frequency: 2 visits per week for 8 weeks (with plan to decreased frequency to 1 visit per week as symptoms and functional progression allow.)    Plan of Care/Certification Expiration Date: 23 (Start of [employer] 2023)      >PT Visit Info:  Plan Frequency: 2 visits per week for 8 weeks (with plan to decreased frequency to 1 visit per week as symptoms and functional progression allow.)  Plan of Care/Certification Expiration Date: 23 (Start of [employer] 2023)      Visit Count:  2    OUTPATIENT PHYSICAL THERAPY:OP NOTE TYPE: OP Note Type: Treatment Note 2023       Episode  }Appt Desk             Treatment Diagnosis:     Visit Diagnoses           Codes     Neck pain    -  Primary M54.2     Radiculopathy, cervical region     M54.12     Medial epicondylitis, right elbow     M77.01     Pain in right elbow     M25.521     Muscle weakness (generalized)     M62.81       Medical/Referring Diagnosis:  Radiculopathy, cervical region [M54.12]  Medial epicondylitis, right elbow [M77.01]  Referring Physician:  Esau Bosworth, MD MD Orders:  PT Eval and Treat  Date of Onset:  Onset Date: 23 (Insidious onset left shoulder and right elbow pain approximately 3 months ago.)     Allergies:   Patient has no allergy information on record. Restrictions/Precautions:  Restrictions/Precautions: Other (comment) (Latex allergy.)  No data recorded     Interventions Planned (Treatment may consist of any combination of the following):    Current Treatment Recommendations: Strengthening; ROM; Functional mobility training; Balance training; ADL/Self-care training; Transfer training; IADL training;  Endurance training; Neuromuscular re-education; Manual; Pain management; Home exercise

## 2023-09-26 ENCOUNTER — HOSPITAL ENCOUNTER (OUTPATIENT)
Dept: PHYSICAL THERAPY | Age: 68
Setting detail: RECURRING SERIES
Discharge: HOME | End: 2023-09-29
Payer: MEDICARE

## 2023-09-26 PROCEDURE — 97110 THERAPEUTIC EXERCISES: CPT

## 2023-09-26 PROCEDURE — 97140 MANUAL THERAPY 1/> REGIONS: CPT

## 2023-09-26 ASSESSMENT — PAIN SCALES - GENERAL: PAINLEVEL_OUTOF10: 4

## 2023-09-26 NOTE — PROGRESS NOTES
Alphonsa Carrel  : 1955  Primary: Yesica Wilson Ppo (Medicare Managed)  Secondary:  [address] @ [address] [facility] [address]  Phone: [phone]  Fax: [phone] Plan Frequency: 2 visits per week for 8 weeks (with plan to decreased frequency to 1 visit per week as symptoms and functional progression allow.)    Plan of Care/Certification Expiration Date: 23 ([facility])      >PT Visit Info:  Plan Frequency: 2 visits per week for 8 weeks (with plan to decreased frequency to 1 visit per week as symptoms and functional progression allow.)  Plan of Care/Certification Expiration Date: 23 ([facility])      Visit Count:  3    OUTPATIENT PHYSICAL THERAPY:OP NOTE TYPE: OP Note Type: Treatment Note 2023       Episode  }Appt Desk             Treatment Diagnosis:     Visit Diagnoses           Codes     Neck pain    -  Primary M54.2     Radiculopathy, cervical region     M54.12     Medial epicondylitis, right elbow     M77.01     Pain in right elbow     M25.521     Muscle weakness (generalized)     M62.81       Medical/Referring Diagnosis:  Radiculopathy, cervical region [M54.12]  Medial epicondylitis, right elbow [M77.01]  Referring Physician:  Esau Bosworth, MD MD Orders:  PT Eval and Treat  Date of Onset:  Onset Date: 23 (Insidious onset left shoulder and right elbow pain approximately 3 months ago.)     Allergies:   Patient has no allergy information on record. Restrictions/Precautions:  Restrictions/Precautions: Other (comment) (Latex allergy.)  No data recorded     Interventions Planned (Treatment may consist of any combination of the following):    Current Treatment Recommendations: Strengthening; ROM; Functional mobility training; Balance training; ADL/Self-care training; Transfer training; IADL training;  Endurance training; Neuromuscular re-education; Manual; Pain management; Home exercise

## 2023-10-03 ENCOUNTER — HOSPITAL ENCOUNTER (OUTPATIENT)
Dept: PHYSICAL THERAPY | Age: 68
Setting detail: RECURRING SERIES
Discharge: HOME | End: 2023-10-06
Payer: MEDICARE

## 2023-10-03 PROCEDURE — 97110 THERAPEUTIC EXERCISES: CPT

## 2023-10-03 PROCEDURE — 97140 MANUAL THERAPY 1/> REGIONS: CPT

## 2023-10-03 ASSESSMENT — PAIN SCALES - GENERAL: PAINLEVEL_OUTOF10: 3

## 2023-10-03 NOTE — PROGRESS NOTES
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 >Initial:     3/10>Post Session:       1/10  Medications Last Reviewed:  10/3/2023  Updated Objective Findings:   Palpation: trigger points mid trap and lower trap noted L side. Treatment   THERAPEUTIC EXERCISE: (25 minutes):    Exercises per grid below to improve mobility, strength, and coordination. Required moderate visual, verbal, manual, and tactile cues to promote proper body alignment, promote proper body poster and proper body mechanics. Progressed resistance, range, repetitions and complexity of movement as indicated. Date:  10/3/2023 Date:  9/22/23 Date:  9/26/23   Activity/Exercise Parameters Parameters Parameters   Supine pec stretch 0h05xjv 7x41gya 0w42kmd   Upper trap stretch 5j24elx 6z60hrx 1s07vwl   Wrist flexor stretch 9z54gyq 8d34dmb 3u13ohy   Wrist flexor isometrics 15x5 sec  69l6jxv   Wrist flexor eccentrics Red TB, 2x10           Scap retract   --   Thoracic extension      Open books            Manual stretching Left upper trap, left levator scap 4m59mnh each  Left upper trap, left levator scap 6m49sgf each   PROM L elbow, R shoulder 3 mins each. L elbow, R shoulder 6 mins each. L elbow, R shoulder 6 mins each. Time spent with patient reviewing proper muscle recruitment and technique with exercises. MANUAL THERAPY: (28 minutes):   Joint mobilization and Soft tissue mobilization was utilized and necessary because of the patient's restricted joint motion, painful spasm, loss of articular motion, and restricted motion of soft tissue.    - Joint mobs: Scapular mobs all planes to tolerance, AC joint grade I AP, gentle cervical spine

## 2023-10-05 ENCOUNTER — HOSPITAL ENCOUNTER (OUTPATIENT)
Dept: PHYSICAL THERAPY | Age: 68
Setting detail: RECURRING SERIES
Discharge: HOME | End: 2023-10-08
Payer: MEDICARE

## 2023-10-05 PROCEDURE — 97110 THERAPEUTIC EXERCISES: CPT

## 2023-10-05 PROCEDURE — 97140 MANUAL THERAPY 1/> REGIONS: CPT

## 2023-10-05 ASSESSMENT — PAIN SCALES - GENERAL: PAINLEVEL_OUTOF10: 4

## 2023-10-05 NOTE — PROGRESS NOTES
Shannan Urrutia  : 1955  Primary: Estephania Lema Ppo (Medicare Managed)  Secondary:  [address] @ [address] [facility] [address]  Phone: [phone]  Fax: [phone] Plan Frequency: 2 visits per week for 8 weeks (with plan to decreased frequency to 1 visit per week as symptoms and functional progression allow.)    Plan of Care/Certification Expiration Date: 23 (Start of [employer] 2023)      >PT Visit Info:  Plan Frequency: 2 visits per week for 8 weeks (with plan to decreased frequency to 1 visit per week as symptoms and functional progression allow.)  Plan of Care/Certification Expiration Date: 23 (Start of [employer] 2023)      Visit Count:  5    OUTPATIENT PHYSICAL THERAPY:OP NOTE TYPE: OP Note Type: Treatment Note 10/5/2023       Episode  }Appt Desk             Treatment Diagnosis:     Visit Diagnoses           Codes     Neck pain    -  Primary M54.2     Radiculopathy, cervical region     M54.12     Medial epicondylitis, right elbow     M77.01     Pain in right elbow     M25.521     Muscle weakness (generalized)     M62.81       Medical/Referring Diagnosis:  Radiculopathy, cervical region [M54.12]  Medial epicondylitis, right elbow [M77.01]  Referring Physician:  Mehrdad Javier MD MD Orders:  PT Eval and Treat  Date of Onset:  Onset Date: 23 (Insidious onset left shoulder and right elbow pain approximately 3 months ago.)     Allergies:   Patient has no allergy information on record. Restrictions/Precautions:  Restrictions/Precautions: Other (comment) (Latex allergy.)  No data recorded     Interventions Planned (Treatment may consist of any combination of the following):    Current Treatment Recommendations: Strengthening; ROM; Functional mobility training; Balance training; ADL/Self-care training; Transfer training; IADL training;  Endurance training; Neuromuscular re-education; Manual; Pain management; Home exercise

## 2023-10-10 ENCOUNTER — HOSPITAL ENCOUNTER (OUTPATIENT)
Dept: PHYSICAL THERAPY | Age: 68
Setting detail: RECURRING SERIES
Discharge: HOME | End: 2023-10-13
Payer: MEDICARE

## 2023-10-10 PROCEDURE — 97140 MANUAL THERAPY 1/> REGIONS: CPT

## 2023-10-10 PROCEDURE — 97110 THERAPEUTIC EXERCISES: CPT

## 2023-10-10 ASSESSMENT — PAIN SCALES - GENERAL: PAINLEVEL_OUTOF10: 3

## 2023-10-10 NOTE — PROGRESS NOTES
joint grade I AP, gentle cervical spine traction.  - Soft tissue mobilization: upper trap, mid trap, lower trap left side, teres major. MODALITIES: (5 minutes, no charge): *  Electrical Stimulation Therapy (IFC) was provided with intensity adjusted throughout treatment to patient tolerance. With moist heat to improve tissue extensibility and reduce muscle tone. HEP: As above; handouts given to patient for all exercises. Treatment/Session Summary:    >Treatment Assessment:  Added open book exercise to progress thoracic and cervical rotation mobility which patient tolerated well; good symptoms relief and improve posture observed post treatment. Communication/Consultation:   None today. Equipment provided today:  None  Recommendations/Intent for next treatment session: Next visit will focus on pain control, improve cervical and thoracic spine mobility/ROM, improve right elbow/wrist AROM, improve RUE strength and postural strength, progress functional activity tolerance. >Total Treatment Billable Duration:  53 mins.   Time In: 1332  Time Out: [address], PT       Charge Capture  }Post Session Pain  PT Visit Info  [employer] Portal  MD Guidelines  Scanned Media  Benefits  MyChart    Future Appointments   Date Time Provider [address]   10/13/2023 12:30 PM Ty Marie, PT [facility]   10/17/2023 12:30 PM Ty Marie, PT [facility]   10/19/2023 12:30 PM Ty Marie, PT [facility]   10/24/2023  1:30 PM Ty Marie, PT [facility]   10/26/2023 12:30 PM Ty Marie, PT [facility]

## 2023-10-13 ENCOUNTER — APPOINTMENT (OUTPATIENT)
Dept: PHYSICAL THERAPY | Age: 68
End: 2023-10-13
Payer: MEDICARE

## 2023-10-17 ENCOUNTER — HOSPITAL ENCOUNTER (OUTPATIENT)
Dept: PHYSICAL THERAPY | Age: 68
Setting detail: RECURRING SERIES
Discharge: HOME | End: 2023-10-20
Payer: MEDICARE

## 2023-10-17 PROCEDURE — 97110 THERAPEUTIC EXERCISES: CPT

## 2023-10-17 PROCEDURE — 97140 MANUAL THERAPY 1/> REGIONS: CPT

## 2023-10-17 ASSESSMENT — PAIN SCALES - GENERAL: PAINLEVEL_OUTOF10: 3

## 2023-10-17 NOTE — PROGRESS NOTES
Renetta Bay  : 1955  Primary: Milton Amador Ppo (Medicare Managed)  Secondary:  [address] @ [address] [facility] [address]  Phone: [phone]  Fax: [phone] Plan Frequency: 2 visits per week for 8 weeks (with plan to decreased frequency to 1 visit per week as symptoms and functional progression allow.)    Plan of Care/Certification Expiration Date: 23 (Start of [employer] 2023)      PT Visit Info:  Plan Frequency: 2 visits per week for 8 weeks (with plan to decreased frequency to 1 visit per week as symptoms and functional progression allow.)  Plan of Care/Certification Expiration Date: 23 (Start of [employer] 2023)      Visit Count:  7                OUTPATIENT PHYSICAL THERAPY:             Progress Report 10/17/2023               Episode (Cervical radiculopathy/right medial epicondylitis) Appt Desk         Treatment Diagnosis:   Visit Diagnoses           Codes     Neck pain    -  Primary M54.2     Radiculopathy, cervical region     M54.12     Medial epicondylitis, right elbow     M77.01     Pain in right elbow     M25.521     Muscle weakness (generalized)     M62.81        Medical/Referring Diagnosis:  Radiculopathy, cervical region [M54.12]  Medial epicondylitis, right elbow [M77.01]  Referring Physician:  Vi Little MD MD Orders:  PT Eval and Treat  Return MD Appt:  TBD by patient  Date of Onset:  Onset Date: 23 (Insidious onset left shoulder and right elbow pain approximately 3 months ago.)      Allergies:  Patient has no allergy information on record. Restrictions/Precautions:    Restrictions/Precautions: Other (comment) (Latex allergy.)        Medications Last Reviewed:  10/17/2023      OBJECTIVE   Patient denies any increase of symptoms with coughing, sneezing or valsalva maneuver.  Patient denies any headaches, changes in vision, dizziness, vertigo, nausea, drop attacks, black outs, tinnitus,

## 2023-10-17 NOTE — PROGRESS NOTES
Jeremi Shahid  : 1955  Primary: Annie Zavala Ppo (Medicare Managed)  Secondary:  [address] @ [address] [facility] [address]  Phone: [phone]  Fax: [phone] Plan Frequency: 2 visits per week for 8 weeks (with plan to decreased frequency to 1 visit per week as symptoms and functional progression allow.)    Plan of Care/Certification Expiration Date: 23 (Start of [employer] 2023)      >PT Visit Info:  Plan Frequency: 2 visits per week for 8 weeks (with plan to decreased frequency to 1 visit per week as symptoms and functional progression allow.)  Plan of Care/Certification Expiration Date: 23 (Start of [employer] 2023)      Visit Count:  7    OUTPATIENT PHYSICAL THERAPY:OP NOTE TYPE: OP Note Type: Treatment Note 10/17/2023       Episode  }Appt Desk             Treatment Diagnosis:     Visit Diagnoses           Codes     Neck pain    -  Primary M54.2     Radiculopathy, cervical region     M54.12     Medial epicondylitis, right elbow     M77.01     Pain in right elbow     M25.521     Muscle weakness (generalized)     M62.81       Medical/Referring Diagnosis:  Radiculopathy, cervical region [M54.12]  Medial epicondylitis, right elbow [M77.01]  Referring Physician:  Silvestre Hendrix MD MD Orders:  PT Eval and Treat  Date of Onset:  Onset Date: 23 (Insidious onset left shoulder and right elbow pain approximately 3 months ago.)     Allergies:   Patient has no allergy information on record. Restrictions/Precautions:  Restrictions/Precautions: Other (comment) (Latex allergy.)  No data recorded     Interventions Planned (Treatment may consist of any combination of the following):    Current Treatment Recommendations: Strengthening; ROM; Functional mobility training; Balance training; ADL/Self-care training; Transfer training; IADL training;  Endurance training; Neuromuscular re-education; Manual; Pain management; Home exercise

## 2023-10-19 ENCOUNTER — HOSPITAL ENCOUNTER (OUTPATIENT)
Dept: PHYSICAL THERAPY | Age: 68
Setting detail: RECURRING SERIES
Discharge: HOME | End: 2023-10-22
Payer: MEDICARE

## 2023-10-19 PROCEDURE — 97140 MANUAL THERAPY 1/> REGIONS: CPT

## 2023-10-19 PROCEDURE — 97110 THERAPEUTIC EXERCISES: CPT

## 2023-10-19 ASSESSMENT — PAIN SCALES - GENERAL: PAINLEVEL_OUTOF10: 4

## 2023-10-19 NOTE — PROGRESS NOTES
Nestor Ewing  : 1955  Primary: Carolin Billings Ppo (Medicare Managed)  Secondary:  [address] @ [address] [facility] [address]  Phone: [phone]  Fax: [phone] Plan Frequency: 2 visits per week for 8 weeks (with plan to decreased frequency to 1 visit per week as symptoms and functional progression allow.)    Plan of Care/Certification Expiration Date: 23 (Start of [employer] 2023)      >PT Visit Info:  Plan Frequency: 2 visits per week for 8 weeks (with plan to decreased frequency to 1 visit per week as symptoms and functional progression allow.)  Plan of Care/Certification Expiration Date: 23 (Start of [employer] 2023)      Visit Count:  8    OUTPATIENT PHYSICAL THERAPY:OP NOTE TYPE: OP Note Type: Treatment Note 10/19/2023       Episode  }Appt Desk             Treatment Diagnosis:     Visit Diagnoses           Codes     Neck pain    -  Primary M54.2     Radiculopathy, cervical region     M54.12     Medial epicondylitis, right elbow     M77.01     Pain in right elbow     M25.521     Muscle weakness (generalized)     M62.81       Medical/Referring Diagnosis:  Radiculopathy, cervical region [M54.12]  Medial epicondylitis, right elbow [M77.01]  Referring Physician:  Nova Martínez MD MD Orders:  PT Eval and Treat  Date of Onset:  Onset Date: 23 (Insidious onset left shoulder and right elbow pain approximately 3 months ago.)     Allergies:   Patient has no allergy information on record. Restrictions/Precautions:  Restrictions/Precautions: Other (comment) (Latex allergy.)  No data recorded     Interventions Planned (Treatment may consist of any combination of the following):    Current Treatment Recommendations: Strengthening; ROM; Functional mobility training; Balance training; ADL/Self-care training; Transfer training; IADL training;  Endurance training; Neuromuscular re-education; Manual; Pain management; Home exercise

## 2023-10-24 ENCOUNTER — HOSPITAL ENCOUNTER (OUTPATIENT)
Dept: PHYSICAL THERAPY | Age: 68
Setting detail: RECURRING SERIES
Discharge: HOME | End: 2023-10-27
Payer: MEDICARE

## 2023-10-24 PROCEDURE — 97110 THERAPEUTIC EXERCISES: CPT

## 2023-10-24 PROCEDURE — 97140 MANUAL THERAPY 1/> REGIONS: CPT

## 2023-10-24 ASSESSMENT — PAIN SCALES - GENERAL: PAINLEVEL_OUTOF10: 4

## 2023-10-24 NOTE — PROGRESS NOTES
restricted joint motion, painful spasm, loss of articular motion, and restricted motion of soft tissue. - Joint mobs: Scapular mobs all planes to tolerance, AC joint grade I AP, gentle cervical spine traction.  - Soft tissue mobilization: upper trap, mid trap, lower trap left side, teres major. MODALITIES: (8 minutes, no charge): *  Electrical Stimulation Therapy (IFC) was provided with intensity adjusted throughout treatment to patient tolerance. With moist heat to improve tissue extensibility and reduce muscle tone. HEP: As above; handouts given to patient for all exercises. Treatment/Session Summary:    >Treatment Assessment:  Pt demonstrating fair tolerance to exercise program and manual therapy today; continued focus on progression shoulder exercise program and continued focus on postural strengthening, cervical and thoracic mobility to address observed mobility restrictions. Communication/Consultation:   None today. Equipment provided today:  None  Recommendations/Intent for next treatment session: Next visit will focus on pain control, improve cervical and thoracic spine mobility/ROM, improve right elbow/wrist AROM, improve RUE strength and postural strength, progress functional activity tolerance. >Total Treatment Billable Duration:  54 mins.   Time In: 1335  Time Out: [address], PT       Charge Capture  }Post Session Pain  PT Visit Info  [employer] Portal   [facility]    Future Appointments   Date Time Provider [address]   10/26/2023 12:30 PM Umm Bundy PT [facility]   10/30/2023  1:30 PM MERI Webster   11/2/2023  1:30 PM MERI Webster   11/6/2023  1:30 PM MERI Webster

## 2023-10-26 ENCOUNTER — HOSPITAL ENCOUNTER (OUTPATIENT)
Dept: PHYSICAL THERAPY | Age: 68
Setting detail: RECURRING SERIES
Discharge: HOME | End: 2023-10-29
Payer: MEDICARE

## 2023-10-26 PROCEDURE — 97140 MANUAL THERAPY 1/> REGIONS: CPT

## 2023-10-26 PROCEDURE — 97110 THERAPEUTIC EXERCISES: CPT

## 2023-10-26 ASSESSMENT — PAIN SCALES - GENERAL: PAINLEVEL_OUTOF10: 4

## 2023-10-26 NOTE — PROGRESS NOTES
Andy Mcburney  : 1955  Primary: Abdirizak Laughlin Ppo (Medicare Managed)  Secondary:  [address] @ [address] [facility] [address]  Phone: [phone]  Fax: [phone] Plan Frequency: 2 visits per week for 8 weeks (with plan to decreased frequency to 1 visit per week as symptoms and functional progression allow.)    Plan of Care/Certification Expiration Date: 23 (Start of [employer] 2023)      >PT Visit Info:  Plan Frequency: 2 visits per week for 8 weeks (with plan to decreased frequency to 1 visit per week as symptoms and functional progression allow.)  Plan of Care/Certification Expiration Date: 23 (Start of [employer] 2023)      Visit Count:  10    OUTPATIENT PHYSICAL THERAPY:OP NOTE TYPE: OP Note Type: Treatment Note 10/26/2023       Episode  }Appt Desk             Treatment Diagnosis:     Visit Diagnoses           Codes     Neck pain    -  Primary M54.2     Radiculopathy, cervical region     M54.12     Medial epicondylitis, right elbow     M77.01     Pain in right elbow     M25.521     Muscle weakness (generalized)     M62.81       Medical/Referring Diagnosis:  Radiculopathy, cervical region [M54.12]  Medial epicondylitis, right elbow [M77.01]  Referring Physician:  Sobia Camargo MD MD Orders:  PT Eval and Treat  Date of Onset:  Onset Date: 23 (Insidious onset left shoulder and right elbow pain approximately 3 months ago.)     Allergies:   Patient has no allergy information on record. Restrictions/Precautions:  Restrictions/Precautions: Other (comment) (Latex allergy.)  No data recorded     Interventions Planned (Treatment may consist of any combination of the following):    Current Treatment Recommendations: Strengthening; ROM; Functional mobility training; Balance training; ADL/Self-care training; Transfer training; IADL training;  Endurance training; Neuromuscular re-education; Manual; Pain management; Home exercise

## 2023-10-30 ENCOUNTER — HOSPITAL ENCOUNTER (OUTPATIENT)
Dept: PHYSICAL THERAPY | Age: 68
Setting detail: RECURRING SERIES
Discharge: HOME | End: 2023-11-02
Payer: MEDICARE

## 2023-10-30 PROCEDURE — 97140 MANUAL THERAPY 1/> REGIONS: CPT

## 2023-10-30 PROCEDURE — 97110 THERAPEUTIC EXERCISES: CPT

## 2023-10-30 ASSESSMENT — PAIN SCALES - GENERAL: PAINLEVEL_OUTOF10: 5

## 2023-10-30 NOTE — PROGRESS NOTES
Zeny Rogers  : 1955  Primary: Govindestuardo Valenzuela Ppo (Medicare Managed)  Secondary:  [address] @ [address] [facility] [address]  Phone: [phone]  Fax: [phone] Plan Frequency: 2 visits per week for 8 weeks (with plan to decreased frequency to 1 visit per week as symptoms and functional progression allow.)    Plan of Care/Certification Expiration Date: 23 ([facility] of [facility] 2023)      >PT Visit Info:  Plan Frequency: 2 visits per week for 8 weeks (with plan to decreased frequency to 1 visit per week as symptoms and functional progression allow.)  Plan of Care/Certification Expiration Date: 23 ([facility] of [facility] 2023)      Visit Count:  11    OUTPATIENT PHYSICAL THERAPY:OP NOTE TYPE: OP Note Type: Treatment Note 10/30/2023       Episode  }Appt Desk             Treatment Diagnosis:     Visit Diagnoses           Codes     Neck pain    -  Primary M54.2     Radiculopathy, cervical region     M54.12     Medial epicondylitis, right elbow     M77.01     Pain in right elbow     M25.521     Muscle weakness (generalized)     M62.81       Medical/Referring Diagnosis:  Radiculopathy, cervical region [M54.12]  Medial epicondylitis, right elbow [M77.01]  Referring Physician:  Chong Glasgow MD MD Orders:  PT Eval and Treat  Date of Onset:  Onset Date: 23 (Insidious onset left shoulder and right elbow pain approximately 3 months ago.)     Allergies:   Patient has no allergy information on record. Restrictions/Precautions:  Restrictions/Precautions: Other (comment) (Latex allergy.)  No data recorded     Interventions Planned (Treatment may consist of any combination of the following):    Current Treatment Recommendations: Strengthening; ROM; Functional mobility training; Balance training; ADL/Self-care training; Transfer training; IADL training;  Endurance training; Neuromuscular re-education; Manual; Pain management; Home exercise

## 2023-11-03 ENCOUNTER — HOSPITAL ENCOUNTER (OUTPATIENT)
Dept: PHYSICAL THERAPY | Age: 68
Setting detail: RECURRING SERIES
Discharge: HOME | End: 2023-11-06
Payer: MEDICARE

## 2023-11-03 PROCEDURE — 97140 MANUAL THERAPY 1/> REGIONS: CPT

## 2023-11-03 ASSESSMENT — PAIN SCALES - GENERAL: PAINLEVEL_OUTOF10: 6

## 2023-11-03 NOTE — PROGRESS NOTES
program; Safety education & training; Patient/Caregiver education & training; Equipment evaluation, education, & procurement; Modalities; Positioning; Dry needling; Therapeutic activities     >Subjective Comments:  Patient reports that her hips are really hurting today. L side of neck is tight.  >Initial:     6/10>Post Session:       3/10  Medications Last Reviewed:  11/3/2023  Updated Objective Findings:   Palpation: tenderness to palpation at R medial epicondyle. Tightness throughout L scapular musculature. Treatment   THERAPEUTIC EXERCISE: (5 minutes):  not billed  Exercises per grid below to improve mobility, strength, and coordination. Required moderate visual, verbal, manual, and tactile cues to promote proper body alignment, promote proper body poster and proper body mechanics. Progressed resistance, range, repetitions and complexity of movement as indicated. Date:  10/26/2023 Date:  10/30/23 Date:  11/3/23   Activity/Exercise Parameters Parameters Parameters   Supine pec stretch 5y54lay 9y22wxf    Upper trap stretch 4u30deb 9y02jne    Wrist flexor stretch 8e11rbr Verbal review    Wrist flexor isometrics 15x5 sec Verbal review    Wrist flexor eccentrics 2#, 2x10 Verbal review    Supine punch Supine, cane 2x10 Supine, cane 2x10    Shoulder flexion Supine, cane 2x10 Supine, cane 2x10    Scap retract  --    Thoracic extension  --    Open books X10 each Verbal review          Manual stretching Left upper trap, left levator scap 5x30bpp each Left upper trap, left levator scap 0y09vqs each L/R upper traps, levator scaps     Pt performed seated rhomboid stretch 2 x 30 sec   PROM L elbow, R shoulder 3 mins each. L elbow, R shoulder 3 mins each. R elbow/wrist flexion/extension; cervical rotation L/R, lateral flexion, flexion   [employer] access code: [email or web address]    Time spent with patient reviewing proper muscle recruitment and technique with exercises.     MANUAL THERAPY: (55 minutes):   Joint mobilization and Soft

## 2023-11-06 ENCOUNTER — HOSPITAL ENCOUNTER (OUTPATIENT)
Dept: PHYSICAL THERAPY | Age: 68
Setting detail: RECURRING SERIES
Discharge: HOME | End: 2023-11-09
Payer: MEDICARE

## 2023-11-06 PROCEDURE — 97140 MANUAL THERAPY 1/> REGIONS: CPT

## 2023-11-06 PROCEDURE — 97110 THERAPEUTIC EXERCISES: CPT

## 2023-11-06 ASSESSMENT — PAIN SCALES - GENERAL: PAINLEVEL_OUTOF10: 4

## 2023-11-06 NOTE — THERAPY RECERTIFICATION
Aj Carlos  : 1955  Primary: Ameya Naik Ppo (Medicare Managed)  Secondary:  [address] @ [address] [facility] [address]  Phone: [phone]  Fax: [phone] Plan Frequency: 2 visits per week for 6 weeks (with plan to decrease frequency to 1 visit per week as symptoms and functional progression allow.)    Plan of Care/Certification Expiration Date: 23 (Start of [address] 2023)      PT Visit Info:  Plan Frequency: 2 visits per week for 6 weeks (with plan to decrease frequency to 1 visit per week as symptoms and functional progression allow.)  Plan of Care/Certification Expiration Date: 23 (Start of POC 2023)      Visit Count:  13                OUTPATIENT PHYSICAL THERAPY:             Recertification 89/3/0150               Episode (Cervical radiculopathy/right medial epicondylitis) Appt Desk         Treatment Diagnosis:   Visit Diagnoses           Codes     Neck pain    -  Primary M54.2     Radiculopathy, cervical region     M54.12     Medial epicondylitis, right elbow     M77.01     Pain in right elbow     M25.521     Muscle weakness (generalized)     M62.81        Medical/Referring Diagnosis:  Radiculopathy, cervical region [M54.12]  Medial epicondylitis, right elbow [M77.01]  Referring Physician:  Mike Luna MD MD Orders:  PT Eval and Treat  Return MD Appt:  TBD by patient  Date of Onset:  Onset Date: 23 (Insidious onset left shoulder and right elbow pain approximately 3 months ago.)      Allergies:  Patient has no allergy information on record. Restrictions/Precautions:    Restrictions/Precautions: Other (comment) (Latex allergy.)        Medications Last Reviewed:  2023      OBJECTIVE   Patient denies any increase of symptoms with coughing, sneezing or valsalva maneuver.  Patient denies any headaches, changes in vision, dizziness, vertigo, nausea, drop attacks, black outs, tinnitus,

## 2023-11-06 NOTE — PROGRESS NOTES
Shannan Urrutia  : 1955  Primary: Estephania Lema Ppo (Medicare Managed)  Secondary:  Kathy Brewer @ [address] [place] [address]  Phone: [phone]  Fax: [phone] Plan Frequency: 2 visits per week for 6 weeks (with plan to decrease frequency to 1 visit per week as symptoms and functional progression allow.)    Plan of Care/Certification Expiration Date: 23 (Start of [address] 2023)      >PT Visit Info:  Plan Frequency: 2 visits per week for 6 weeks (with plan to decrease frequency to 1 visit per week as symptoms and functional progression allow.)  Plan of Care/Certification Expiration Date: 23 (Start of POC 2023)      Visit Count:  13    OUTPATIENT PHYSICAL THERAPY:OP NOTE TYPE: OP Note Type: Treatment Note 2023       Episode  }Appt Desk             Treatment Diagnosis:     Visit Diagnoses           Codes     Neck pain    -  Primary M54.2     Radiculopathy, cervical region     M54.12     Medial epicondylitis, right elbow     M77.01     Pain in right elbow     M25.521     Muscle weakness (generalized)     M62.81       Medical/Referring Diagnosis:  Radiculopathy, cervical region [M54.12]  Medial epicondylitis, right elbow [M77.01]  Referring Physician:  Mehrdad Javier MD MD Orders:  PT Eval and Treat  Date of Onset:  Onset Date: 23 (Insidious onset left shoulder and right elbow pain approximately 3 months ago.)     Allergies:   Patient has no allergy information on record. Restrictions/Precautions:  Restrictions/Precautions: Other (comment) (Latex allergy.)  No data recorded     Interventions Planned (Treatment may consist of any combination of the following):    Current Treatment Recommendations: Strengthening; ROM; Functional mobility training; Balance training; ADL/Self-care training; Transfer training; IADL training;  Endurance training; Neuromuscular re-education; Manual; Pain management; Home exercise

## 2023-11-10 ENCOUNTER — HOSPITAL ENCOUNTER (OUTPATIENT)
Dept: PHYSICAL THERAPY | Age: 68
Setting detail: RECURRING SERIES
Discharge: HOME | End: 2023-11-13
Payer: MEDICARE

## 2023-11-10 PROCEDURE — 97140 MANUAL THERAPY 1/> REGIONS: CPT

## 2023-11-10 ASSESSMENT — PAIN SCALES - GENERAL: PAINLEVEL_OUTOF10: 5

## 2023-11-10 NOTE — PROGRESS NOTES
[id number]    Time spent with patient reviewing proper muscle recruitment and technique with exercises. MANUAL THERAPY: (43 minutes):   Joint mobilization and Soft tissue mobilization was utilized and necessary because of the patient's restricted joint motion, painful spasm, loss of articular motion, and restricted motion of soft tissue. - Joint mobs: Scapular mobs to L scapular all planes to tolerance; pt in R sidelying.   - Gentle cervical spine traction. Pt supine with bolster/pillow under B knees  - Soft tissue mobilization: upper trap, mid trap, lower trap left side, suboccipital release. Pt supine. - Soft tissue mobilization to medial R elbow and musculature (flexors)    MODALITIES: (10 minutes, not billed): *  Hot Pack Therapy in order to provide analgesia and relieve muscle spasm. At L shoulder. HEP: As above; no handouts provided today. Treatment/Session Summary:    >Treatment Assessment:  Patient tolerated session well, reporting reduction of pain following the above interventions. Patient is encouraged to rest, perform relaxation techniques, and continue use of heat at home to aid in muscle tension relief. Communication/Consultation:   HEP, muscle relaxation techniques. Equipment provided today:  None  Recommendations/Intent for next treatment session: Next visit will focus on pain control, improve cervical and thoracic spine mobility/ROM, improve right elbow/wrist AROM, improve RUE strength and postural strength, progress functional activity tolerance. >Total Treatment Billable Duration:  43 mins.   Time In: 1534  Time Out: 401 Marga Harris, PT       Charge Capture  }Post Session Pain  PT Visit Info  [employer] Portal  MD Guidelines  Scanned Media  Benefits  MyChart    Future Appointments   Date Time Provider [address]   11/14/2023  1:30 PM Malena Ch PT [facility]   11/17/2023 11:00 AM Malena Ch PT [facility]   11/21/2023  1:30 PM Malena Ch PT

## 2023-11-14 ENCOUNTER — HOSPITAL ENCOUNTER (OUTPATIENT)
Dept: PHYSICAL THERAPY | Age: 68
Setting detail: RECURRING SERIES
Discharge: HOME | End: 2023-11-17
Payer: MEDICARE

## 2023-11-14 PROCEDURE — 97140 MANUAL THERAPY 1/> REGIONS: CPT

## 2023-11-14 PROCEDURE — 97110 THERAPEUTIC EXERCISES: CPT

## 2023-11-14 ASSESSMENT — PAIN SCALES - GENERAL: PAINLEVEL_OUTOF10: 6

## 2023-11-14 NOTE — PROGRESS NOTES
Renetta Marrow  : 1955  Primary: Milton Amador Ppo (Medicare Managed)  Secondary:  [address] @ [address] [facility] [address]  Phone: [phone]  Fax: [phone] Plan Frequency: 2 visits per week for 6 weeks (with plan to decrease frequency to 1 visit per week as symptoms and functional progression allow.)    Plan of Care/Certification Expiration Date: 23 (Start of [address] 2023)      >PT Visit Info:  Plan Frequency: 2 visits per week for 6 weeks (with plan to decrease frequency to 1 visit per week as symptoms and functional progression allow.)  Plan of Care/Certification Expiration Date: 23 (Start of [facility] 2023)      Visit Count:  15    OUTPATIENT PHYSICAL THERAPY:OP NOTE TYPE: OP Note Type: Treatment Note 2023       Episode  }Appt Desk             Treatment Diagnosis:     Visit Diagnoses           Codes     Neck pain    -  Primary M54.2     Radiculopathy, cervical region     M54.12     Medial epicondylitis, right elbow     M77.01     Pain in right elbow     M25.521     Muscle weakness (generalized)     M62.81       Medical/Referring Diagnosis:  Radiculopathy, cervical region [M54.12]  Medial epicondylitis, right elbow [M77.01]  Referring Physician:  Vi Little MD MD Orders:  PT Eval and Treat  Date of Onset:  Onset Date: 23 (Insidious onset left shoulder and right elbow pain approximately 3 months ago.)     Allergies:   Patient has no allergy information on record. Restrictions/Precautions:  Restrictions/Precautions: Other (comment) (Latex allergy.)  No data recorded     Interventions Planned (Treatment may consist of any combination of the following):    Current Treatment Recommendations: Strengthening; ROM; Functional mobility training; Balance training; ADL/Self-care training; Transfer training; IADL training;  Endurance training; Neuromuscular re-education; Manual; Pain management; Home exercise

## 2023-11-21 ENCOUNTER — HOSPITAL ENCOUNTER (OUTPATIENT)
Dept: PHYSICAL THERAPY | Age: 68
Setting detail: RECURRING SERIES
Discharge: HOME | End: 2023-11-24
Payer: MEDICARE

## 2023-11-21 PROCEDURE — 97110 THERAPEUTIC EXERCISES: CPT

## 2023-11-21 PROCEDURE — 97140 MANUAL THERAPY 1/> REGIONS: CPT

## 2023-11-21 ASSESSMENT — PAIN SCALES - GENERAL: PAINLEVEL_OUTOF10: 3

## 2023-11-21 NOTE — PROGRESS NOTES
Martha Elam  : 1955  Primary: Maryann Peoples Ppo (Medicare Managed)  Secondary:  [address] @ [address] [facility] [address]  Phone: [phone]  Fax: [phone] Plan Frequency: 2 visits per week for 6 weeks (with plan to decrease frequency to 1 visit per week as symptoms and functional progression allow.)    Plan of Care/Certification Expiration Date: 23 (Start of [address] 2023)      >PT Visit Info:  Plan Frequency: 2 visits per week for 6 weeks (with plan to decrease frequency to 1 visit per week as symptoms and functional progression allow.)  Plan of Care/Certification Expiration Date: 23 (Start of POC 2023)      Visit Count:  16    OUTPATIENT PHYSICAL THERAPY:OP NOTE TYPE: OP Note Type: Treatment Note 2023       Episode  }Appt Desk             Treatment Diagnosis:     Visit Diagnoses           Codes     Neck pain    -  Primary M54.2     Radiculopathy, cervical region     M54.12     Medial epicondylitis, right elbow     M77.01     Pain in right elbow     M25.521     Muscle weakness (generalized)     M62.81       Medical/Referring Diagnosis:  Radiculopathy, cervical region [M54.12]  Medial epicondylitis, right elbow [M77.01]  Referring Physician:  Lui Garcia MD MD Orders:  PT Eval and Treat  Date of Onset:  Onset Date: 23 (Insidious onset left shoulder and right elbow pain approximately 3 months ago.)     Allergies:   Patient has no allergy information on record. Restrictions/Precautions:  Restrictions/Precautions: Other (comment) (Latex allergy.)  No data recorded     Interventions Planned (Treatment may consist of any combination of the following):    Current Treatment Recommendations: Strengthening; ROM; Functional mobility training; Balance training; ADL/Self-care training; Transfer training; IADL training;  Endurance training; Neuromuscular re-education; Manual; Pain management; Home exercise

## 2023-11-28 ENCOUNTER — HOSPITAL ENCOUNTER (OUTPATIENT)
Dept: PHYSICAL THERAPY | Age: 68
Setting detail: RECURRING SERIES
Discharge: HOME | End: 2023-12-01
Payer: MEDICARE

## 2023-11-28 PROCEDURE — 97110 THERAPEUTIC EXERCISES: CPT

## 2023-11-28 PROCEDURE — 97140 MANUAL THERAPY 1/> REGIONS: CPT

## 2023-11-28 ASSESSMENT — PAIN SCALES - GENERAL: PAINLEVEL_OUTOF10: 6

## 2023-11-30 NOTE — PROGRESS NOTES
UE PROM all planes; pt supine. Education   Performance of HEP reinforced    [address] access code: [email or web address]    Time spent with patient reviewing proper muscle recruitment and technique with exercises. MANUAL THERAPY: (30 minutes):   Joint mobilization and Soft tissue mobilization was utilized and necessary because of the patient's restricted joint motion, painful spasm, loss of articular motion, and restricted motion of soft tissue. - Joint mobs: Scapular mobs to L scapular all planes to tolerance. - Gentle cervical spine distraction.   - L shoulder distraction grade II; supine  - Deep tissue mobilization to adhesions in L upper trap and L biceps. - Soft tissue mobilization: upper trap, mid trap, lower trap left side, suboccipital release. Pt supine. MODALITIES: (8 minutes, not billed): Applied during soft tissue mobilization and manual stretching to distal L arm. *  Hot Pack Therapy in order to provide analgesia and relieve muscle spasm. At L shoulder. HEP: As above; no handouts provided today. Treatment/Session Summary:    >Treatment Assessment:   Patient tolerated session fairly well. Increased pain in neck and arms and tension were moderately relieved after the above interventions. Communication/Consultation:   HEP, muscle relaxation techniques. Equipment provided today:  None  Recommendations/Intent for next treatment session: Next visit will focus on pain control, improve cervical and thoracic spine mobility/ROM, improve right elbow/wrist AROM, improve RUE strength and postural strength, progress functional activity tolerance. >Total Treatment Billable Duration:  55 mins.   Time In: 1335  Time Out: 515 [facility], PT       Charge Capture  }Post Session Pain  PT Visit 25 [facility]  MD Guidelines  Scanned Media  Benefits  MyChart    Future Appointments   Date Time Provider [address] [place] [address]   12/5/2023  1:30 PM Dominick Vázquez, PT [facility]   12/12/2023

## 2023-12-05 ENCOUNTER — HOSPITAL ENCOUNTER (OUTPATIENT)
Dept: PHYSICAL THERAPY | Age: 68
Setting detail: RECURRING SERIES
End: 2023-12-05
Payer: MEDICARE

## 2023-12-05 NOTE — PROGRESS NOTES
PHYSICAL THERAPY   [facility] [place] 12/5/2023      Patient called by front office; this PT needs to be off Wednesday through Friday. Patient states that she will wait until next week when PT back in clinic.    Justina Rivera, PT, DPT

## 2023-12-12 ENCOUNTER — HOSPITAL ENCOUNTER (OUTPATIENT)
Dept: PHYSICAL THERAPY | Age: 68
Setting detail: RECURRING SERIES
Discharge: HOME | End: 2023-12-15
Payer: MEDICARE

## 2023-12-12 PROCEDURE — 97110 THERAPEUTIC EXERCISES: CPT

## 2023-12-12 NOTE — PROGRESS NOTES
Aida Barcenas  : 1955  Primary: Anne-Marie Preston Ppo (Medicare Managed)  Secondary:  [address] @ [address] [facility] [address]  Phone: [phone]  Fax: [phone] Plan Frequency: 2 visits per week for 6 weeks (with plan to decrease frequency to 1 visit per week as symptoms and functional progression allow.)    Plan of Care/Certification Expiration Date: 23 (Start of [address] 2023)      >PT Visit Info:  Plan Frequency: 2 visits per week for 6 weeks (with plan to decrease frequency to 1 visit per week as symptoms and functional progression allow.)  Plan of Care/Certification Expiration Date: 23 (Start of POC 2023)      Visit Count:  18    OUTPATIENT PHYSICAL THERAPY:OP NOTE TYPE: OP Note Type: Treatment Note 2023       Episode  }Appt Desk             Treatment Diagnosis:     Visit Diagnoses           Codes     Neck pain    -  Primary M54.2     Radiculopathy, cervical region     M54.12     Medial epicondylitis, right elbow     M77.01     Pain in right elbow     M25.521     Muscle weakness (generalized)     M62.81       Medical/Referring Diagnosis:  Radiculopathy, cervical region [M54.12]  Medial epicondylitis, right elbow [M77.01]  Referring Physician:  Maral Martin MD MD Orders:  PT Eval and Treat  Date of Onset:  Onset Date: 23 (Insidious onset left shoulder and right elbow pain approximately 3 months ago.)     Allergies:   Patient has no allergy information on record. Restrictions/Precautions:  Restrictions/Precautions: Other (comment) (Latex allergy.)  No data recorded     Interventions Planned (Treatment may consist of any combination of the following):    Current Treatment Recommendations: Strengthening; ROM; Functional mobility training; Balance training; ADL/Self-care training; Transfer training; IADL training;  Endurance training; Neuromuscular re-education; Manual; Pain management; Home exercise

## 2023-12-12 NOTE — THERAPY DISCHARGE
Hipolito Erwin  : 1955  Primary: Starr Amezquita Ppo (Medicare Managed)  Secondary:  [address] @ [address] [facility] [address]  Phone: [phone]  Fax: [phone] Plan Frequency: 2 visits per week for 6 weeks (with plan to decrease frequency to 1 visit per week as symptoms and functional progression allow.)    Plan of Care/Certification Expiration Date: 23 (Start of [address] [facility] 2023)      PT Visit Info:  Plan Frequency: 2 visits per week for 6 weeks (with plan to decrease frequency to 1 visit per week as symptoms and functional progression allow.)  Plan of Care/Certification Expiration Date: 23 (Start of [facility] 2023)      Visit Count:  18                OUTPATIENT PHYSICAL THERAPY:             Discharge Summary 2023               Episode (Cervical radiculopathy/right medial epicondylitis) Appt Desk         Treatment Diagnosis:   Visit Diagnoses           Codes     Neck pain    -  Primary M54.2     Radiculopathy, cervical region     M54.12     Medial epicondylitis, right elbow     M77.01     Pain in right elbow     M25.521     Muscle weakness (generalized)     M62.81        Medical/Referring Diagnosis:  Radiculopathy, cervical region [M54.12]  Medial epicondylitis, right elbow [M77.01]  Referring Physician:  Noa Olmos MD MD Orders:  PT Eval and Treat  Return MD Appt:  TBD by patient  Date of Onset:  Onset Date: 23 (Insidious onset left shoulder and right elbow pain approximately 3 months ago.)      Allergies:  Patient has no allergy information on record. Restrictions/Precautions:    Restrictions/Precautions: Other (comment) (Latex allergy.)        Medications Last Reviewed:  2023      OBJECTIVE   Addendum for 23 Discharge Note: See below for new measurements in bold; otherwise from initial eval.   Adhesions at upper trap.  Tenderness to palpation along upper traps, L shoulder, and medial R

## 2024-04-26 ENCOUNTER — TRANSCRIBE ORDERS (OUTPATIENT)
Dept: SCHEDULING | Age: 69
End: 2024-04-26

## 2024-04-26 DIAGNOSIS — Z12.31 SCREENING MAMMOGRAM FOR HIGH-RISK PATIENT: Primary | ICD-10-CM

## 2024-05-31 ENCOUNTER — HOSPITAL ENCOUNTER (OUTPATIENT)
Dept: MAMMOGRAPHY | Age: 69
End: 2024-05-31
Payer: MEDICARE

## 2024-05-31 VITALS — HEIGHT: 63 IN | BODY MASS INDEX: 24.8 KG/M2 | WEIGHT: 140 LBS

## 2024-05-31 DIAGNOSIS — Z12.31 SCREENING MAMMOGRAM FOR HIGH-RISK PATIENT: ICD-10-CM

## 2024-05-31 PROCEDURE — 77063 BREAST TOMOSYNTHESIS BI: CPT

## 2025-05-19 ENCOUNTER — TRANSCRIBE ORDERS (OUTPATIENT)
Dept: SCHEDULING | Age: 70
End: 2025-05-19

## 2025-05-19 DIAGNOSIS — Z12.31 OTHER SCREENING MAMMOGRAM: Primary | ICD-10-CM

## 2025-06-13 ENCOUNTER — HOSPITAL ENCOUNTER (OUTPATIENT)
Dept: MAMMOGRAPHY | Age: 70
Discharge: HOME | End: 2025-06-16
Payer: MEDICARE

## 2025-06-13 VITALS — BODY MASS INDEX: 26.22 KG/M2 | WEIGHT: 148 LBS | HEIGHT: 63 IN

## 2025-06-13 DIAGNOSIS — Z12.31 OTHER SCREENING MAMMOGRAM: ICD-10-CM

## 2025-06-13 PROCEDURE — 77067 SCR MAMMO BI INCL CAD: CPT
